# Patient Record
Sex: FEMALE | URBAN - METROPOLITAN AREA
[De-identification: names, ages, dates, MRNs, and addresses within clinical notes are randomized per-mention and may not be internally consistent; named-entity substitution may affect disease eponyms.]

---

## 2021-06-18 ENCOUNTER — APPOINTMENT (RX ONLY)
Dept: URBAN - METROPOLITAN AREA CLINIC 168 | Facility: CLINIC | Age: 46
Setting detail: DERMATOLOGY
End: 2021-06-18

## 2021-06-18 ENCOUNTER — APPOINTMENT (OUTPATIENT)
Age: 46
Setting detail: DERMATOLOGY
End: 2021-06-18

## 2021-06-18 ENCOUNTER — APPOINTMENT (RX ONLY)
Dept: URBAN - METROPOLITAN AREA CLINIC 77 | Facility: CLINIC | Age: 46
Setting detail: DERMATOLOGY
End: 2021-06-18

## 2021-06-18 DIAGNOSIS — L81.1 CHLOASMA: ICD-10-CM

## 2021-06-18 DIAGNOSIS — L81.1 CHLOASMA: ICD-10-CM | Status: INADEQUATELY CONTROLLED

## 2021-06-18 PROCEDURE — 99204 OFFICE O/P NEW MOD 45 MIN: CPT

## 2021-06-18 PROCEDURE — ? PRESCRIPTION

## 2021-06-18 PROCEDURE — ? PRESCRIPTION MEDICATION MANAGEMENT

## 2021-06-18 PROCEDURE — ? COUNSELING

## 2021-06-18 RX ORDER — HYDROQUINONE 4 %
THIN LAYER CREAM (GRAM) TOPICAL BID
Qty: 1 | Refills: 1 | Status: ERX | COMMUNITY
Start: 2021-06-18

## 2021-06-18 RX ADMIN — Medication THIN LAYER: at 00:00

## 2021-06-18 NOTE — PROCEDURE: COUNSELING
Patient Specific Counseling (Will Not Stick From Patient To Patient): -\\nRecommended ZO brightening agents, chemical peels, colorscience powder for uv protection and a consultation with Toby. Patient admits she does use sunscreen with spf 50 but does not re-apply. Could also try hydroquinone alone initially and start something more aggressive in fall if necessary once sun is not as strong. Patient prefers this. Will start hydroquinone regimen BID X 2 months. Then two months off.
Detail Level: Zone

## 2021-06-18 NOTE — PROCEDURE: PRESCRIPTION MEDICATION MANAGEMENT
Plan: Wash face with gentle cleanser \\nApply hydroquinone to face BID x 2 months, then stop x 1 month. May repeat BID x 2 months. \\nApply spf 30 or greater\\nWill re-check in few months. (After summer)
Detail Level: Zone
Initiate Treatment: Apply hydroquinone to face BID x 2 months, then stop x 1 month. May repeat BID x 2 months.
Render In Strict Bullet Format?: No

## 2023-02-10 ENCOUNTER — APPOINTMENT (RX ONLY)
Dept: URBAN - METROPOLITAN AREA CLINIC 77 | Facility: CLINIC | Age: 48
Setting detail: DERMATOLOGY
End: 2023-02-10

## 2023-02-10 DIAGNOSIS — L81.1 CHLOASMA: ICD-10-CM

## 2023-02-10 DIAGNOSIS — L27.1 LOCALIZED SKIN ERUPTION DUE TO DRUGS AND MEDICAMENTS TAKEN INTERNALLY: ICD-10-CM

## 2023-02-10 PROCEDURE — ? CHRONOLOGY OF PRESENT ILLNESS

## 2023-02-10 PROCEDURE — 99214 OFFICE O/P EST MOD 30 MIN: CPT

## 2023-02-10 PROCEDURE — ? COUNSELING

## 2023-02-10 PROCEDURE — ? PRESCRIPTION

## 2023-02-10 PROCEDURE — ? PRESCRIPTION MEDICATION MANAGEMENT

## 2023-02-10 RX ORDER — TRETIONIN 0.25 MG/G
THIN LAYER CREAM TOPICAL QD
Qty: 45 | Refills: 2 | Status: ERX | COMMUNITY
Start: 2023-02-10

## 2023-02-10 RX ORDER — DESONIDE 0.5 MG/G
THIN LAYER CREAM TOPICAL BID
Qty: 60 | Refills: 3 | Status: ERX | COMMUNITY
Start: 2023-02-10

## 2023-02-10 RX ADMIN — DESONIDE THIN LAYER: 0.5 CREAM TOPICAL at 00:00

## 2023-02-10 RX ADMIN — TRETIONIN THIN LAYER: 0.25 CREAM TOPICAL at 00:00

## 2023-02-10 ASSESSMENT — LOCATION SIMPLE DESCRIPTION DERM: LOCATION SIMPLE: LEFT ANTERIOR NECK

## 2023-02-10 ASSESSMENT — LOCATION ZONE DERM: LOCATION ZONE: NECK

## 2023-02-10 ASSESSMENT — LOCATION DETAILED DESCRIPTION DERM: LOCATION DETAILED: LEFT INFERIOR ANTERIOR NECK

## 2023-02-10 NOTE — PROCEDURE: CHRONOLOGY OF PRESENT ILLNESS
Detail Level: Zone
Chronology Of Present Illness: 6/18/21\\nRecommended ZO brightening agents, chemical peels, colorscience powder for uv protection and a consultation with Toby. Patient admits she does use sunscreen with spf 50 but does not re-apply. Could also try hydroquinone alone initially and start something more aggressive in fall if necessary once sun is not as strong. Patient prefers this. Will start hydroquinone regimen BID X 2 months. Then two months off. \\n\\n2/10/23\\nPatient reports little change using hydroquinone prescribed. Discussed starting compounded medication with patient as well as doing VI peels. Will prescribe cream to start QD for 2-3 months and then stop for 1 month, then repeat.  May meet with Velia Hong if desired to further discuss ZO products

## 2023-02-10 NOTE — PROCEDURE: PRESCRIPTION MEDICATION MANAGEMENT
Plan: Advised to wear sunscreen on region
Detail Level: Zone
Initiate Treatment: -\\n\\nDeLightful Cream (patient to try at lower grams, may refill at higher quantity)\\n\\nHydroquinone USP 6%\\nKojic acid USP 2%\\nVit C USP 2.5%\\nTretinoin USP 0.025%\\nHydrocortisone USP 1%\\nHyaluronic Acid EXCP 0.1%
Render In Strict Bullet Format?: No
Detail Level: Simple
Initiate Treatment: desonide 0.05 % topical cream BID

## 2023-03-03 ENCOUNTER — APPOINTMENT (RX ONLY)
Dept: URBAN - METROPOLITAN AREA CLINIC 77 | Facility: CLINIC | Age: 48
Setting detail: DERMATOLOGY
End: 2023-03-03

## 2023-03-03 DIAGNOSIS — L81.1 CHLOASMA: ICD-10-CM

## 2023-03-03 PROCEDURE — ? COSMETIC CONSULTATION: CHEMICAL PEELS

## 2023-03-03 ASSESSMENT — LOCATION SIMPLE DESCRIPTION DERM: LOCATION SIMPLE: UPPER LIP

## 2023-03-03 ASSESSMENT — LOCATION DETAILED DESCRIPTION DERM: LOCATION DETAILED: PHILTRUM

## 2023-03-03 ASSESSMENT — LOCATION ZONE DERM: LOCATION ZONE: LIP

## 2023-03-03 NOTE — HPI: COSMETIC CONSULTATION
When Outside In The Sun, Do You...: mostly burns, rarely tans
Additional History: \\n\\nConsult \\n\\n- melasma upper lip\\n\\nRecommended the series of 3 VI peel precision every 4wks apart \\n\\n* discussed post care instructions \\n\\n- gave sample of brightalive \\n\\n- Scheduled first appointment on March 10,2023

## 2023-03-17 ENCOUNTER — APPOINTMENT (RX ONLY)
Dept: URBAN - METROPOLITAN AREA CLINIC 77 | Facility: CLINIC | Age: 48
Setting detail: DERMATOLOGY
End: 2023-03-17

## 2023-03-17 DIAGNOSIS — L81.1 CHLOASMA: ICD-10-CM

## 2023-03-17 PROCEDURE — ? VI PEEL

## 2023-03-17 ASSESSMENT — LOCATION DETAILED DESCRIPTION DERM: LOCATION DETAILED: LEFT INFERIOR CENTRAL MALAR CHEEK

## 2023-03-17 ASSESSMENT — LOCATION ZONE DERM: LOCATION ZONE: FACE

## 2023-03-17 ASSESSMENT — LOCATION SIMPLE DESCRIPTION DERM: LOCATION SIMPLE: LEFT CHEEK

## 2023-03-17 NOTE — PROCEDURE: VI PEEL
Chemical Peel: VI Peel
Price (Use Numbers Only, No Special Characters Or $): [id number]
Post-Care Instructions: I reviewed with the patient in detail post-care instructions. Patient should avoid sun exposure and wear sun protection.
Post Peel Care: After the procedure, the patient was instructed not to wash the treated area for 6-8 hours or manually remove dead skin when the peeling process starts. Patient may use OTC hydrocortisone cream for itching. Patient instructed to use the provided Retin-A wipes on the treated area on the 1st and 2nd nights.
Detail Level: Zone
Consent: Prior to the procedure, written consent was obtained and risks were reviewed, including but not limited to: redness, peeling, blistering, pigmentary change, scarring, infection, and pain. Patient is aware multiple treatments may be necessary to achieve the desired outcome.
Treatment Number: 1
Prep: The treated area was degreased with pre-peel cleanser, and vaseline was applied for protection of mucous membranes.

## 2023-03-17 NOTE — HPI: COSMETIC (CHEMICAL PEEL)
Have You Had A Chemical Peel Before?: has not had a previous peel
When Outside In The Sun, Do You...: rarely burns, mostly tans
Additional History: \\n\\nVI peel precision \\n\\n- melasma upper lip \\n\\n* went over post care instructions and gave post kit \\n\\n\\n